# Patient Record
Sex: FEMALE | Race: WHITE
[De-identification: names, ages, dates, MRNs, and addresses within clinical notes are randomized per-mention and may not be internally consistent; named-entity substitution may affect disease eponyms.]

---

## 2018-07-25 ENCOUNTER — HOSPITAL ENCOUNTER (EMERGENCY)
Dept: HOSPITAL 25 - UCEAST | Age: 68
Discharge: HOME | End: 2018-07-25
Payer: MEDICARE

## 2018-07-25 VITALS — DIASTOLIC BLOOD PRESSURE: 94 MMHG | SYSTOLIC BLOOD PRESSURE: 160 MMHG

## 2018-07-25 DIAGNOSIS — Z88.5: ICD-10-CM

## 2018-07-25 DIAGNOSIS — S39.92XA: Primary | ICD-10-CM

## 2018-07-25 DIAGNOSIS — Z87.891: ICD-10-CM

## 2018-07-25 DIAGNOSIS — Z88.2: ICD-10-CM

## 2018-07-25 DIAGNOSIS — R03.0: ICD-10-CM
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
## 2018-07-25 PROCEDURE — 99211 OFF/OP EST MAY X REQ PHY/QHP: CPT

## 2018-07-25 PROCEDURE — 72220 X-RAY EXAM SACRUM TAILBONE: CPT

## 2018-07-25 PROCEDURE — G0463 HOSPITAL OUTPT CLINIC VISIT: HCPCS

## 2018-07-25 NOTE — ED
Lower Extremity





- HPI Summary


HPI Summary: 


68 year female presents with tailbone injury today.  States she slipped in 

[place] and fell and landed on her tailbone.  She denies any head injury.  No 

loss conscious.  No other injury.  She denies any lower back pain.   No pain 

into her legs.  No numbness or tingling.  She does not have issues with 

constipation.  She states pain is worst with ambulation. 








- History of Current Complaint


Chief Complaint: UCBackPain


Stated Complaint: SACRUM INJURY FROM FALL


Time Seen by Provider: 07/25/18 15:41


Hx Last Menstrual Period: 


Pain Intensity: 6





- Allergies/Home Medications


Allergies/Adverse Reactions: 


 Allergies











Allergy/AdvReac Type Severity Reaction Status Date / Time


 


meperidine [From Demerol] Allergy  Tachycardia Verified 07/25/18 15:40


 


Sulfa (Sulfonamide Allergy  Unknown Verified 07/25/18 15:40





Antibiotics)   Reaction  





   Details  











Home Medications: 


 Home Medications





NK [No Home Medications Reported]  07/25/18 [History Confirmed 07/25/18]











PMH/Surg Hx/FS Hx/Imm Hx


Endocrine/Hematology History: 


   Denies: Hx Anticoagulant Therapy


Cardiovascular History: 


   Denies: Hx Myocardial Infarction





- Surgical History


Surgery Procedure, Year, and Place: cone biopsy,ovarian cyst removal, appy


Infectious Disease History: No


Infectious Disease History: 


   Denies: Traveled Outside the [place] in Last 30 Days





- Family History


Known Family History: Positive: None





- Social History


Alcohol Use: Daily


Alcohol Amount: 1-2


Hx Substance Use: No


Substance Use Type: Reports: None


Hx Tobacco Use: No


Smoking Status (MU): Former Smoker





Review of Systems


Negative: Fever


Negative: Chest Pain


Negative: Shortness Of Breath


Positive: Myalgia - tailbone pain


All Other Systems Reviewed And Are Negative: Yes





Physical Exam


Triage Information Reviewed: Yes


Vital Signs On Initial Exam: 


 Initial Vitals











Temp Pulse Resp BP Pulse Ox


 


 98.0 F   110   16   160/94   100 


 


 07/25/18 15:31  07/25/18 15:31  07/25/18 15:31  07/25/18 15:31  07/25/18 15:31











Vital Signs Reviewed: Yes


Appearance: Positive: Well-Appearing


Skin: Positive: Warm, Dry


Head/Face: Positive: Normal Head/Face Inspection


Eyes: Positive: Normal, Conjunctiva Clear


ENT: Positive: Pharynx normal


Respiratory/Lung Sounds: Positive: Clear to Auscultation, Breath Sounds Present


Cardiovascular: Positive: Normal, RRR


Musculoskeletal: Positive: Other - tenderness coccyx, nontender lower back, neg 

SLR, good pulses


Neurological: Positive: Normal, Sensory/Motor Intact


Psychiatric: Positive: Normal





Diagnostics





- Vital Signs


 Vital Signs











  Temp Pulse Resp BP Pulse Ox


 


 07/25/18 15:31  98.0 F  110  16  160/94  100














- Laboratory


Lab Statement: Any lab studies that have been ordered have been reviewed, and 

results considered in the medical decision making process.





- Radiology


  ** coccyx


Xray Interpretation: No Acute Changes


Radiology Interpretation Completed By: Radiologist





Lower Extremity Course/Dx





- Course


Course Of Treatment: 68 year female presents with tailbone injury today.  

States she slipped in [place] and fell and landed on her tailbone.  She denies 

any head injury.  No loss conscious.  No other injury.  She denies any lower 

back pain.   No pain into her legs.  No numbness or tingling.  She does not 

have issues with constipation.  She states pain is worst with ambulation.  On 

exam tenderness over the tailbone. Nontender lower back.  Neurovascular intact.

  X-ray shows no fracture.  We'll treat with ice and Tylenol.  Will have follow 

with primary as bp is elevated at this visit. Patient understands agrees plan.





- Diagnoses


Differential Diagnosis/HQI/PQRI: Positive: Fracture (Closed), Sprain, Strain


Provider Diagnoses: 


 Tailbone injury, Elevated blood pressure reading








Discharge





- Sign-Out/Discharge


Documenting (check all that apply): Patient Departure





- Discharge Plan


Condition: Good


Disposition: HOME


Patient Education Materials:  Contusion in Adults (ED)


Referrals: 


No Primary Care Phys,NOPCP [Primary Care Provider] - 


Additional Instructions: 


Take Tylenol or ibuprofen every 6 hours for pain


Use doughnut pillow to avoid putting pressure on area


Increase fiber intake


Follow up with primary in 5 days


Return to ED if develop any new or worsening symptoms





- Billing Disposition and Condition


Condition: GOOD


Disposition: Home

## 2018-07-25 NOTE — RAD
INDICATION: Fall. Pain.



COMPARISON: None

 

TECHNIQUE: 2 views of the coccyx and sacrum were performed.



FINDINGS: There is osteopenia. There is no acute bony change. 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

change of the lower lumbar spine. The soft tissues are normal. The SI joints and symphysis

are intact.



IMPRESSION:  NO ACUTE FINDINGS.